# Patient Record
Sex: FEMALE | Race: WHITE | NOT HISPANIC OR LATINO | URBAN - METROPOLITAN AREA
[De-identification: names, ages, dates, MRNs, and addresses within clinical notes are randomized per-mention and may not be internally consistent; named-entity substitution may affect disease eponyms.]

---

## 2018-10-04 ENCOUNTER — TELEPHONE (OUTPATIENT)
Dept: CARDIOLOGY | Facility: CLINIC | Age: 83
End: 2018-10-04

## 2018-10-04 NOTE — TELEPHONE ENCOUNTER
Received faxed records from 's office for evaluation of PVL s/p TAVR.  Will need to get disc of TTE and TAVR procedure report for review.  Once received will have Dr. Brar reviewed will call and schedule appointments.  Also spoke with Dr. Johns's office and they will mail CD and fax TAVR report.

## 2018-12-11 DIAGNOSIS — I35.0 NODULAR CALCIFIC AORTIC VALVE STENOSIS: Primary | ICD-10-CM

## 2019-01-02 RX ORDER — FUROSEMIDE 40 MG/1
40 TABLET ORAL DAILY
COMMUNITY
Start: 2018-11-19

## 2019-01-02 RX ORDER — PRAVASTATIN SODIUM 80 MG/1
80 TABLET ORAL DAILY
COMMUNITY
Start: 2018-12-04

## 2019-01-02 RX ORDER — AMLODIPINE BESYLATE 5 MG/1
TABLET ORAL
COMMUNITY
Start: 2018-09-25 | End: 2019-01-07

## 2019-01-02 RX ORDER — METOPROLOL SUCCINATE 100 MG/1
100 TABLET, EXTENDED RELEASE ORAL DAILY
COMMUNITY
Start: 2018-11-19

## 2019-01-02 RX ORDER — TEMAZEPAM 15 MG/1
15 CAPSULE ORAL NIGHTLY PRN
COMMUNITY
Start: 2018-11-20

## 2019-01-02 RX ORDER — CLINDAMYCIN HYDROCHLORIDE 300 MG/1
CAPSULE ORAL
COMMUNITY
Start: 2018-11-05 | End: 2019-01-07

## 2019-01-02 RX ORDER — CYCLOBENZAPRINE HCL 5 MG
5 TABLET ORAL 2 TIMES DAILY
COMMUNITY
Start: 2018-12-01

## 2019-01-02 RX ORDER — MUPIROCIN 20 MG/G
OINTMENT TOPICAL
COMMUNITY
Start: 2018-11-05 | End: 2019-01-07

## 2019-01-02 RX ORDER — METOLAZONE 2.5 MG/1
TABLET ORAL
COMMUNITY
Start: 2018-09-25 | End: 2019-01-07

## 2019-01-02 RX ORDER — WARFARIN SODIUM 2.5 MG/1
2.5 TABLET ORAL DAILY
COMMUNITY
Start: 2018-11-12

## 2019-01-02 RX ORDER — LEVOTHYROXINE SODIUM 50 UG/1
50 TABLET ORAL DAILY
COMMUNITY
Start: 2018-12-03

## 2019-01-07 ENCOUNTER — HOSPITAL ENCOUNTER (OUTPATIENT)
Dept: CARDIOLOGY | Facility: CLINIC | Age: 84
Discharge: HOME | End: 2019-01-07
Attending: INTERNAL MEDICINE
Payer: MEDICARE

## 2019-01-07 ENCOUNTER — INITIAL CONSULT (OUTPATIENT)
Dept: CARDIOLOGY | Facility: CLINIC | Age: 84
End: 2019-01-07
Payer: MEDICARE

## 2019-01-07 VITALS
HEART RATE: 75 BPM | DIASTOLIC BLOOD PRESSURE: 110 MMHG | WEIGHT: 123.44 LBS | OXYGEN SATURATION: 100 % | SYSTOLIC BLOOD PRESSURE: 174 MMHG | HEIGHT: 61 IN | BODY MASS INDEX: 23.3 KG/M2

## 2019-01-07 VITALS
BODY MASS INDEX: 20.38 KG/M2 | SYSTOLIC BLOOD PRESSURE: 144 MMHG | DIASTOLIC BLOOD PRESSURE: 86 MMHG | HEIGHT: 63 IN | HEART RATE: 75 BPM | WEIGHT: 115 LBS

## 2019-01-07 DIAGNOSIS — I35.0 NODULAR CALCIFIC AORTIC VALVE STENOSIS: ICD-10-CM

## 2019-01-07 DIAGNOSIS — I48.20 CHRONIC ATRIAL FIBRILLATION: ICD-10-CM

## 2019-01-07 DIAGNOSIS — I87.2 VENOUS INSUFFICIENCY: ICD-10-CM

## 2019-01-07 DIAGNOSIS — Z95.2 S/P TAVR (TRANSCATHETER AORTIC VALVE REPLACEMENT): ICD-10-CM

## 2019-01-07 DIAGNOSIS — I10 ESSENTIAL HYPERTENSION: ICD-10-CM

## 2019-01-07 DIAGNOSIS — I50.32 CHRONIC DIASTOLIC HEART FAILURE: ICD-10-CM

## 2019-01-07 LAB
ASCENDING AORTA: 2.6 CM
AV INDEX (PROSTH): 0.4
AV MEAN GRADIENT: 11 MMHG
AV PEAK GRADIENT: 16.97 MMHG
AV VALVE AREA: 1.34 CM2
BSA FOR ECHO PROCEDURE: 1.52 M2
CV ECHO LV RWT: 0.49 CM
DOP CALC AO PEAK VEL: 2.06 M/S
DOP CALC AO VTI: 46.2 CM
DOP CALC LVOT AREA: 3.33 CM2
DOP CALC LVOT DIAMETER: 2.06 CM
DOP CALC LVOT STROKE VOLUME: 61.76 CM3
DOP CALCLVOT PEAK VEL VTI: 18.54 CM
E/E' RATIO: 17.73
ECHO LV POSTERIOR WALL: 0.82 CM (ref 0.6–1.1)
FRACTIONAL SHORTENING: 38 % (ref 28–44)
INTERVENTRICULAR SEPTUM: 0.88 CM (ref 0.6–1.1)
IVRT: 0.09 MSEC
LA MAJOR: 5.77 CM
LA MINOR: 5.6 CM
LA WIDTH: 4 CM
LEFT ATRIUM SIZE: 4.23 CM
LEFT ATRIUM VOLUME INDEX: 53.5 ML/M2
LEFT ATRIUM VOLUME: 81.74 CM3
LEFT INTERNAL DIMENSION IN SYSTOLE: 2.08 CM (ref 2.1–4)
LEFT VENTRICLE DIASTOLIC VOLUME INDEX: 29.43 ML/M2
LEFT VENTRICLE DIASTOLIC VOLUME: 44.99 ML
LEFT VENTRICLE MASS INDEX: 49.6 G/M2
LEFT VENTRICLE SYSTOLIC VOLUME INDEX: 9.2 ML/M2
LEFT VENTRICLE SYSTOLIC VOLUME: 13.99 ML
LEFT VENTRICULAR INTERNAL DIMENSION IN DIASTOLE: 3.33 CM (ref 3.5–6)
LEFT VENTRICULAR MASS: 75.76 G
LV LATERAL E/E' RATIO: 14.78
LV SEPTAL E/E' RATIO: 22.17
MV PEAK E VEL: 1.33 M/S
PISA TR MAX VEL: 3.07 M/S
RA MAJOR: 5.52 CM
RA PRESSURE: 8 MMHG
RA WIDTH: 3.8 CM
RIGHT VENTRICULAR END-DIASTOLIC DIMENSION: 3.87 CM
RV TISSUE DOPPLER FREE WALL SYSTOLIC VELOCITY 1 (APICAL 4 CHAMBER VIEW): 8.31 M/S
SINUS: 2.51 CM
STJ: 2.22 CM
TDI LATERAL: 0.09
TDI SEPTAL: 0.06
TDI: 0.08
TR MAX PG: 37.7 MMHG
TRICUSPID ANNULAR PLANE SYSTOLIC EXCURSION: 1.46 CM
TV REST PULMONARY ARTERY PRESSURE: 46 MMHG

## 2019-01-07 PROCEDURE — 99204 PR OFFICE/OUTPT VISIT, NEW, LEVL IV, 45-59 MIN: ICD-10-PCS | Mod: S$PBB,,, | Performed by: INTERNAL MEDICINE

## 2019-01-07 PROCEDURE — 99999 PR PBB SHADOW E&M-EST. PATIENT-LVL IV: ICD-10-PCS | Mod: PBBFAC,,, | Performed by: INTERNAL MEDICINE

## 2019-01-07 PROCEDURE — 99214 OFFICE O/P EST MOD 30 MIN: CPT | Mod: PBBFAC,25 | Performed by: INTERNAL MEDICINE

## 2019-01-07 PROCEDURE — 99999 PR PBB SHADOW E&M-EST. PATIENT-LVL IV: CPT | Mod: PBBFAC,,, | Performed by: INTERNAL MEDICINE

## 2019-01-07 PROCEDURE — 99204 OFFICE O/P NEW MOD 45 MIN: CPT | Mod: S$PBB,,, | Performed by: INTERNAL MEDICINE

## 2019-01-07 PROCEDURE — 93306 TTE W/DOPPLER COMPLETE: CPT | Mod: PBBFAC | Performed by: INTERNAL MEDICINE

## 2019-01-07 PROCEDURE — 93306 TRANSTHORACIC ECHO (TTE) COMPLETE (CUPID ONLY): ICD-10-PCS | Mod: 26,S$PBB,, | Performed by: INTERNAL MEDICINE

## 2019-01-07 RX ORDER — ACETAMINOPHEN AND PHENYLEPHRINE HCL 325; 5 MG/1; MG/1
1 TABLET ORAL DAILY
COMMUNITY

## 2019-01-07 RX ORDER — ACETAMINOPHEN 500 MG
1 TABLET ORAL DAILY
COMMUNITY

## 2019-01-07 RX ORDER — TRAMADOL HYDROCHLORIDE 50 MG/1
50 TABLET ORAL EVERY 6 HOURS PRN
COMMUNITY

## 2019-01-07 RX ORDER — OXYBUTYNIN CHLORIDE 5 MG/1
5 TABLET, EXTENDED RELEASE ORAL DAILY
COMMUNITY

## 2019-01-07 RX ORDER — DEXTROMETHORPHAN HYDROBROMIDE, GUAIFENESIN 5; 100 MG/5ML; MG/5ML
650 LIQUID ORAL
COMMUNITY

## 2019-01-07 NOTE — LETTER
January 7, 2019      Dom Johns MD  [address]  [facility] [address]           [facility]  [address]  [facility] [address]  Phone: [phone]          Patient: Shari Wiley   MR Number: [id number]   YOB: 1927   Date of Visit: 1/7/2019       Dear Dr. Dom Johns:    Thank you for referring Shari Wiley to me for evaluation. Attached you will find relevant portions of my assessment and plan of care.    If you have questions, please do not hesitate to call me. I look forward to following Shari Wiley along with you.    Sincerely,    Petros Brar MD    Enclosure  CC:  No Recipients    If you would like to receive this communication electronically, please contact [email or web address] or [phone] to request more information on [facility] access.    For providers and/or their staff who would like to refer a patient to [facility], please contact us through our one-stop-shop provider referral line, [facility], at [phone].    If you feel you have received this communication in error or would no longer like to receive these types of communications, please e-mail [email or web address]

## 2019-01-07 NOTE — ASSESSMENT & PLAN NOTE
s/p 20 mm Celina S3 TAVR via TF access on 7/26/18 (in [place]).  Normal valve function with no evidence for PVL on today's echo.

## 2019-01-07 NOTE — PROGRESS NOTES
Subjective:    Patient ID:  Shari Wiley is a 89 y.o. female who presents for evaluation of Shortness of Breath    Referring: Dr. Dom Johns    HPI  Ms. Wiley is a delightful lady referred by Dr. Johns who is s/p 20 mm Celina S3 TAVR via TF access on 7/26/18 (in [place]). She did well initially and her HARRIS improved, but about 2 weeks after her TAVR she began to develop worsening HARRIS. She now becomes SOB walking from her bedroom to her living room. She has chronic LE edema related to her venous insufficiency, but her edema does not go down with elevation or compression stockings. She occasionally has PND and orthopnea; she will wake up in the middle of the night and have to sleep in a recliner.     She has chronic a-fib. Dr. Jenkins upgraded her to a BiV pacer recently, but she noted no improvement in her symptoms.     She was told she may have a perivalvular leak, but echo today shows no evidence of PVL. She does have significant diastolic dysfunction.    Her BP today is elevated (160/100). She states when she takes it at home it's around 140s/90s. Her weight has been slowly increasing. Her baseline weight is around 115-117 and she is currently up to 123. She has been taking 80 mg of lasix daily for 1 week without weight loss. She eats out at restaurants about 3x/week.     Review of Systems   Constitution: Negative for chills, diaphoresis, fever, weakness, weight gain and weight loss.   HENT: Negative for sore throat.    Eyes: Negative for blurred vision, vision loss in left eye, vision loss in right eye and visual disturbance.   Cardiovascular: Positive for dyspnea on exertion, leg swelling, orthopnea and paroxysmal nocturnal dyspnea. Negative for chest pain, claudication, near-syncope, palpitations and syncope.   Respiratory: Negative for cough, hemoptysis, shortness of breath, sputum production and wheezing.    Endocrine: Negative for cold intolerance and heat intolerance.   Hematologic/Lymphatic:  "Negative for adenopathy. Does not bruise/bleed easily.   Skin: Negative for rash.   Musculoskeletal: Negative for falls, muscle weakness and myalgias.   Gastrointestinal: Negative for abdominal pain, change in bowel habit, constipation, diarrhea, melena and nausea.   Genitourinary: Negative for bladder incontinence.   Neurological: Negative for dizziness, focal weakness, headaches, light-headedness and numbness.   Psychiatric/Behavioral: Negative for altered mental status.         Vitals:    01/07/19 1136 01/07/19 1141   BP: (!) 160/100 (!) 174/110   BP Location: Right arm Left arm   Patient Position: Sitting Sitting   BP Method: Large (Manual) Large (Manual)   Pulse: 75 75   SpO2: 100%    Weight: 56 kg (123 lb 7.3 oz)    Height: 5' 1.42" (1.56 m)      Body mass index is 23.01 kg/m².    Objective:    Physical Exam   Constitutional: She is oriented to person, place, and time. She appears well-developed and well-nourished. No distress.   HENT:   Head: Normocephalic and atraumatic.   Mouth/Throat: Oropharynx is clear and moist.   Eyes: Conjunctivae and EOM are normal. Pupils are equal, round, and reactive to light. No scleral icterus.   Neck: Neck supple. No JVD present. No tracheal deviation present.   Cardiovascular: Normal rate and regular rhythm. Exam reveals no gallop and no friction rub.   Murmur heard.  Pulmonary/Chest: Effort normal and breath sounds normal. No respiratory distress. She has no wheezes. She has no rales. She exhibits no tenderness.   Abdominal: Soft. Bowel sounds are normal. She exhibits no distension. There is no hepatosplenomegaly. There is no tenderness.   Musculoskeletal: She exhibits no edema or tenderness.   Neurological: She is alert and oriented to person, place, and time.   Skin: Skin is warm and dry. No rash noted. No erythema.   Psychiatric: She has a normal mood and affect. Her behavior is normal.         Assessment:         S/P TAVR (transcatheter aortic valve replacement)  s/p 20 " "mm Celina S3 TAVR via TF access on 7/26/18 (in [place]).  Normal valve function with no evidence for PVL on today's echo.     Chronic diastolic heart failure  Evidence of volume overload on physical exam.   Stable at this time.   On Lasix daily.     Chronic atrial fibrillation  S/p BiV pacemaker.  On Coumadin for AC.     Venous insufficiency  Chronic.  Follows with granddaughter, Dr. Tito Chery.     Essential hypertension  Uncontrolled.     Plan:       "Salt is poison".   BP control and diuresis per Dr. Johns.     Staff:  I have personally taken the history and examined this patient and agree with the fellow's note as stated above and amended it accordingly :-) Agree with Marjorie.  TAVR valve looks good without stenosis or AI.  Her problem is HTN and diastolic HF and this can be managed conservatively.  Salt is poison.        "